# Patient Record
Sex: MALE | Race: WHITE
[De-identification: names, ages, dates, MRNs, and addresses within clinical notes are randomized per-mention and may not be internally consistent; named-entity substitution may affect disease eponyms.]

---

## 2021-02-24 ENCOUNTER — HOSPITAL ENCOUNTER (OUTPATIENT)
Dept: HOSPITAL 56 - MW.SDS | Age: 56
Discharge: HOME | End: 2021-02-24
Attending: SURGERY
Payer: COMMERCIAL

## 2021-02-24 DIAGNOSIS — K64.4: ICD-10-CM

## 2021-02-24 DIAGNOSIS — Z79.899: ICD-10-CM

## 2021-02-24 DIAGNOSIS — Z80.0: ICD-10-CM

## 2021-02-24 DIAGNOSIS — Z86.19: ICD-10-CM

## 2021-02-24 DIAGNOSIS — K64.8: ICD-10-CM

## 2021-02-24 DIAGNOSIS — K63.5: ICD-10-CM

## 2021-02-24 DIAGNOSIS — D36.10: ICD-10-CM

## 2021-02-24 DIAGNOSIS — Z87.891: ICD-10-CM

## 2021-02-24 DIAGNOSIS — Z12.11: Primary | ICD-10-CM

## 2021-02-24 DIAGNOSIS — K29.50: ICD-10-CM

## 2021-02-24 DIAGNOSIS — Z20.822: ICD-10-CM

## 2021-02-24 DIAGNOSIS — K57.30: ICD-10-CM

## 2021-02-24 DIAGNOSIS — Z01.812: ICD-10-CM

## 2021-02-24 DIAGNOSIS — K22.8: ICD-10-CM

## 2021-02-24 PROCEDURE — 45380 COLONOSCOPY AND BIOPSY: CPT

## 2021-02-24 PROCEDURE — U0002 COVID-19 LAB TEST NON-CDC: HCPCS

## 2021-02-24 PROCEDURE — 43239 EGD BIOPSY SINGLE/MULTIPLE: CPT

## 2021-02-24 PROCEDURE — 87635 SARS-COV-2 COVID-19 AMP PRB: CPT

## 2021-02-24 NOTE — [id number]
Post Anesthesia Note





- EVALUATION WITHIN 48HRS OF ANESTHETIC


Vital Signs in Normal Range: Yes


Patient Participated in Evaluation: Yes


Respiratory Function Stable: Yes


Airway Patent: Yes


Cardiovascular Function Stable: Yes


Hydration Status Stable: Yes


Pain Control Satisfactory: Yes


Nausea and Vomiting Control Satisfactory: Yes


Mental Status Recovered: Yes


Vital Signs: 


                                Last Vital Signs











Temp  97.2 F   02/24/21 10:25


 


Pulse  74   02/24/21 10:25


 


Resp  16   02/24/21 10:25


 


BP  112/77   02/24/21 10:25


 


Pulse Ox  97   02/24/21 10:25

## 2021-02-24 NOTE — OR
SURGEON:

Jose Cunningham MD

 

DATE OF PROCEDURE:  02/24/2021

 

PREOPERATIVE DIAGNOSES:

Positive family history and Helicobacter pylori infection history.

 

PROCEDURES PERFORMED:

Esophagogastroduodenoscopy with biopsy and colonoscopy with biopsy.

 

DESCRIPTION OF PROCEDURE:

EGD:  The patient was taken to the endoscopy room, and with the CRNA, Diprivan

was administered.

 

A well-lubricated EGD scope was gently inserted through the oropharynx, down the

esophagus, passing through the gastroesophageal junction, into the stomach.  The

mucosa was examined upon the passage.  Any etiology will be noted.  Once in the

stomach, we continued to advance to the distal antrum, passed through the

pylorus into the second portion of the duodenum.  Again, the mucosa was examined

for any abnormality and etiology.

 

The scope was then retrieved back to the stomach and then retroflexed to look at

the fundus of the stomach.  If a biopsy was indicated, we will biopsy the

antrum, body, and gastroesophageal junction.  The air will be sucked out while

the scope is retrieved to reduce the patient's discomfort.

 

The patient tolerated the procedure well.  There were no intraoperative

complications.  Dr. Cunningham was present through the whole procedure.

 

Prior to surgery, a time-out had been called, the patient identified, procedure

identified and antibiotic administered.

 

The patient was taken to the endoscopy room.  A time out was called, patient

identified, and procedure identified.  Diprivan was then administrated.  Patient

went from awake to sleep, hearing doctor talking or door closing is normal.

Perineum inspection and digital examination were then performed.  A well-

lubricated colonoscope was gently inserted through the rectum, advanced past the

rectosigmoid junction, the descending colon, splenic flexure, transverse colon,

hepatic flexure, ascending colon, arrived to the cecum.  Cecum was identified as

dictated in the finding.  Then the scope was carefully withdrawn while attention

was paid to the mucosal surface for any abnormality.  Air will be sucked out

during the scope withdrawal.  At the rectum, retroflexed to examine any rectal

diseases, fistula or hemorrhoids.  During mucosal examination, abnormality or

polyp was noted; picture taken and biopsy performed.  Patient tolerated

procedure well.  There were no intraoperative complications, and Dr. Cnuningham was

present throughout the whole procedure.

 

FINDINGS:

EGD findings:

1. The patient is easily sedated with CRNA and Diprivan, the patient is

    soundly snoring.

2. Oropharynx and proximal esophagus are free of disease, stricture,

    inflammation, none of those.  Distal esophagus at GE junction at 40 shows

    large amount of salmon-colored change, consistent with acid reflux, but

    they are not inflamed, just change to the mucosa appearance.  Compared to

    previous, it is not that inflamed.  Stomach rugae are normal in appearance.

    Antrum is mildly inflamed.  At three areas looked like it is petechial

    hemorrhaging, but it is not really bleeding.  Duodenum is grossly normal.

    Retroflexed look at the fundus of stomach, the patient has a small hiatal

    hernia.  Biopsy done at antrum two times because of petechiae, concern

    about real ulcer.  It is not aura bleeding.  Biopsy done at antrum, biopsy

    done at body, biopsy done at GE junction at 40, and sucked out the gas

    while scope pulling out.  During the whole study, there is no blood, there

    is no aura ulcer, no food, no bile.

 

Colonoscopy findings:

1. The patient is easily sedated with CRNA and Diprivan, the patient is

    soundly snoring.

2. Bowel prep is average with a large amount of liquid stool, no semi-formed

    stool, no stool ball.

3. Colon rather straightforward.  Cecum indicated by the ileocecal fold, one-

    to-one indentation, and appendiceal orifice.  ScopeGuide is pointing south.

    Mucosa examined upon scope pulling out with some irrigation.  The patient

    has one tiny polyp, like 1 mm, at distance 100 cm when scope pulling out.

    Another one is more like a fold than a polyp.  Anyway, we biopsy removed

    it.  It is about 3 mm sessile polyp, removed with cold biopsy forceps.  The

    patient has mild diverticulosis in the left colon.  No signs or symptoms of

    diverticulitis.  The patient has minimal external hemorrhoids, more or less

    like a skin tag, and some mild internal hemorrhoids.  The patient would

    benefit from repeat colonoscopy in 3 years from today because of a positive

    family history or if clinically or the pathology of the biopsy turns out

    different.

 

 

CARRIE / NISSA

DD:  02/24/2021 10:12:41

DT:  02/24/2021 12:16:49

Job #:  [id number]

## 2021-02-24 NOTE — PCM.PREANE
Preanesthetic Assessment





- Anesthesia/Transfusion/Family Hx


Anesthesia History: Prior Anesthesia Without Reaction


Other Type of Anesthesia Reaction Comment: vaso-vagal,  had bradycardia with 

last colonoscopy


Family History of Anesthesia Reaction: No


Transfusion History: No Prior Transfusion(s)


Intubation History: Unknown





- Review of Systems


General: No Symptoms


Pulmonary: No Symptoms


Cardiovascular: No Symptoms


Gastrointestinal: No Symptoms


Neurological: No Symptoms


Other: Reports: None





- Physical Assessment


NPO Status Date: 02/23/21


Height: 5 ft 10 in


Weight: 85.729 kg


ASA Class: 1


Mental Status: Alert & Oriented x3


Airway Class: Mallampati = 2


Dentition: Reports: Normal Dentition


ROM/Head Extension: Full


Lungs: Clear to Auscultation, Normal Respiratory Effort


Cardiovascular: Regular Rate, Regular Rhythm





- Lab


Values: 





                             Laboratory Last Values











SARS-CoV-2 RNA ([facility])  NEGATIVE  (NEGATIVE)   02/24/21  07:20    














- Allergies


Allergies/Adverse Reactions: 


                                    Allergies











Allergy/AdvReac Type Severity Reaction Status Date / Time


 


No Known Allergies Allergy   Verified 02/11/21 07:45














- Blood


Blood Available: No





- Anesthesia Plan


Pre-Op Medication Ordered: None





- Acknowledgements


Anesthesia Type Planned: General Anesthesia (tiva)


Pt an Appropriate Candidate for the Planned Anesthesia: Yes


Alternatives and Risks of Anesthesia Discussed w Pt/Guardian: Yes


Pt/Guardian Understands and Agrees with Anesthesia Plan: Yes


Additional Comments: 





PMH: none





PreAnesthesia Questionnaire


HEENT History: Reports: Impaired Vision


Other HEENT History: wears glasses


Cardiovascular History: Reports: None


Respiratory History: Reports: None


Gastrointestinal History: Reports: Colon Polyp, Helicobacter Pylori


Other Gastrointestinal History: occasional heartburn


Genitourinary History: Reports: None


Musculoskeletal History: Reports: Fracture


Other Musculoskeletal History: hx of fx ribs, sternum and hand


Neurological History: Reports: None


Psychiatric History: Reports: Depression


Endocrine/Metabolic History: Reports: None


Hematologic History: Reports: None


Immunologic History: Reports: None


Oncologic (Cancer) History: Reports: None


Dermatologic History: Reports: None





- Past Surgical History


Head Surgeries/Procedures: Reports: None


HEENT Surgical History: Reports: None


Cardiovascular Surgical History: Reports: None


Respiratory Surgical History: Reports: None


GI Surgical History: Reports: Colonoscopy


Male  Surgical History: Reports: None


Endocrine Surgical History: Reports: None


Neurological Surgical History: Reports: None


Musculoskeletal Surgical History: Reports: Arthroscopic Knee, ORIF


Other Musculoskeletal Surgeries/Procedures:: Hx of ORIF of hand- hardware 

removed


Oncologic Surgical History: Reports: None


Dermatological Surgical History: Reports: None





- SUBSTANCE USE


Tobacco Use Status *Q: Former Tobacco User


Tobacco Use Within Last Twelve Months: No





- HOME MEDS


Home Medications: 


                                    Home Meds





Cyclobenzaprine HCl 10 mg PO BEDTIME PRN 12/26/17 [History]


Diclofenac Sodium [Voltaren] 75 mg PO BID PRN 12/26/17 [History]


FLUoxetine [PROzac] 20 mg PO DAILY 12/26/17 [History]


Esomeprazole [NexIUM] 20 mg PO DAILY PRN 12/23/20 [History]











- CURRENT (IN HOUSE) MEDS


Current Meds: 





                               Current Medications





Lactated Ringer's (Ringers, Lactated)  1,000 mls @ 125 mls/hr IV ASDIRECTED JAKUB

## 2021-02-24 NOTE — PCM.POSTAN
POST ANESTHESIA ASSESSMENT





- MENTAL STATUS


Mental Status: Alert, Oriented





- VITAL SIGNS


Vital Signs: 


                                Last Vital Signs











Temp  97.2 F   02/24/21 10:25


 


Pulse  74   02/24/21 10:25


 


Resp  16   02/24/21 10:25


 


BP  112/77   02/24/21 10:25


 


Pulse Ox  97   02/24/21 10:25














- RESPIRATORY


Respiratory Status: Respiratory Rate WNL, Airway Patent, O2 Saturation Stable





- CARDIOVASCULAR


CV Status: Pulse Rate WNL, Blood Pressure Stable





- GASTROINTESTINAL


GI Status: No Symptoms





- POST OP HYDRATION


Hydration Status: Adequate & Stable

## 2021-02-24 NOTE — PCM.OPNOTE
- General Post-Op/Procedure Note


Date of Surgery/Procedure: 02/24/21


Operative Procedure(s): egd w bx.  colonoscopy w bx


Findings: 





see [id number]


Pre Op Diagnosis: positive fam hx and hpylori infection hx


Post-Op Diagnosis: Same


Anesthesia Technique: Moderate Sedation


Primary Surgeon: Jose Cunningham


Pathology: 





egd bx


and colon bx at distance 100cm when coming out and 3mm sessile polyp at 70cm 

when coming out


Complications: None


Condition: Good